# Patient Record
Sex: FEMALE | Race: WHITE
[De-identification: names, ages, dates, MRNs, and addresses within clinical notes are randomized per-mention and may not be internally consistent; named-entity substitution may affect disease eponyms.]

---

## 2022-03-19 PROBLEM — I10 ESSENTIAL HYPERTENSION: Status: ACTIVE | Noted: 2019-10-07

## 2022-03-19 PROBLEM — I83.93 VARICOSE VEINS OF BOTH LOWER EXTREMITIES: Status: ACTIVE | Noted: 2019-10-07

## 2022-03-19 PROBLEM — E28.2 PCOS (POLYCYSTIC OVARIAN SYNDROME): Status: ACTIVE | Noted: 2019-10-07

## 2022-03-20 PROBLEM — E78.5 HYPERLIPIDEMIA: Status: ACTIVE | Noted: 2017-09-11

## 2022-05-18 PROBLEM — R73.03 PREDIABETES: Status: ACTIVE | Noted: 2022-05-17

## 2022-05-24 ENCOUNTER — PATIENT MESSAGE (OUTPATIENT)
Dept: FAMILY MEDICINE CLINIC | Facility: CLINIC | Age: 42
End: 2022-05-24

## 2022-05-24 DIAGNOSIS — N64.4 BREAST PAIN, RIGHT: Primary | ICD-10-CM

## 2022-05-25 NOTE — TELEPHONE ENCOUNTER
From: Nakul Salazar  To: Dr. Neal Bodily: 5/24/2022 3:51 PM EDT  Subject: Wendi Sharpe! I saw Dr. Daniel Albright a few weeks ago. She sent a referral to [facility] for a mammogram and ultrasound. It has been nothing but problems trying to make an appointment. The call keeps getting dropped and they don't call me back when that happens. I was hoping that you could refer me to [facility] AND [facility]. I left a voicemail last Thursday on the doctors line. I also sent a message last week. I still have not had a response. I am guessing because of the update on my chart.  Thank you

## 2022-09-15 ENCOUNTER — TELEPHONE (OUTPATIENT)
Dept: FAMILY MEDICINE CLINIC | Facility: CLINIC | Age: 42
End: 2022-09-15

## 2022-09-15 NOTE — TELEPHONE ENCOUNTER
----- Message from Carlin Goodpasture sent at 9/15/2022  2:52 PM EDT -----  Subject: Message to Provider    QUESTIONS  Information for Provider? Pt would like to receive a letter to insurance   company ([employer]) in order for company to cover b12 order [address]   that order needed to be completed. Please contact pt once letter is   completed for .   ---------------------------------------------------------------------------  --------------  [address] [facility]  [phone]; OK to leave message on voicemail  ---------------------------------------------------------------------------  --------------  SCRIPT ANSWERS  Relationship to Patient?  Self

## 2022-09-16 NOTE — TELEPHONE ENCOUNTER
I don't know anything about this; have never heard of an insurance company needing a letter to cover a vitamin, plus Vit B12 is not on pt's current med list; also Vit B12 is available OTC? ? Need a lot more explanation of what pt is wanting & why before we can help her with this.

## 2022-09-19 ENCOUNTER — TELEPHONE (OUTPATIENT)
Dept: FAMILY MEDICINE CLINIC | Facility: CLINIC | Age: 42
End: 2022-09-19

## 2022-09-19 NOTE — TELEPHONE ENCOUNTER
I called the patients cell phone and left a message to schedule an appointment with Dr. Sophie Llanes to discuss the reason for the letter. Since he has already seen a message about this request for her insurance to pay for the lab.

## 2022-09-19 NOTE — TELEPHONE ENCOUNTER
There was a major mix up with the [facility] and the [place] in regards to what the patient was needing. Patient called requesting letter for insurance to cover Vitamin D labs. Requesting a call back when the letter is ready. Ok to leave a message.